# Patient Record
Sex: MALE | Race: WHITE
[De-identification: names, ages, dates, MRNs, and addresses within clinical notes are randomized per-mention and may not be internally consistent; named-entity substitution may affect disease eponyms.]

---

## 2018-03-26 ENCOUNTER — HOSPITAL ENCOUNTER (EMERGENCY)
Dept: HOSPITAL 25 - ED | Age: 23
LOS: 1 days | Discharge: HOME | End: 2018-03-27
Payer: COMMERCIAL

## 2018-03-26 DIAGNOSIS — R07.9: Primary | ICD-10-CM

## 2018-03-26 PROCEDURE — 71045 X-RAY EXAM CHEST 1 VIEW: CPT

## 2018-03-26 PROCEDURE — 99283 EMERGENCY DEPT VISIT LOW MDM: CPT

## 2018-03-26 PROCEDURE — 93005 ELECTROCARDIOGRAM TRACING: CPT

## 2018-03-27 VITALS — DIASTOLIC BLOOD PRESSURE: 65 MMHG | SYSTOLIC BLOOD PRESSURE: 107 MMHG

## 2018-03-27 NOTE — RAD
INDICATION: Chest pain     



COMPARISON: None

 

TECHNIQUE: An AP portable view obtained at 0312 hours is submitted.



FINDINGS: 



Bones/Soft Tissues: There are no acute bony findings.    



Cardiomediastinal: The cardiomediastinal silhouette is normal. 



Lungs: There are no infiltrates. There is no pneumothorax.



Pleura: There are no pleural effusions. 



Other: None



IMPRESSION:  NEGATIVE EXAMINATION.

## 2018-03-28 NOTE — ED
Rolando PALACIOS Angela, scribed for Sherif Ordonez MD on 03/27/18 at 0255 .





HPI Chest Pain





- HPI Summary


HPI Summary: 





This pt is a 23 y/[facility] sided chest pain for more 

than 24 hours. Pt reports that he has hx of GERD and this pain feels like GERD, 

but it doesn't typically last more than 24 hours. Pt describes chest pain as non

-radiating pressure on the top left of his chest. His GERD is usually 

alleviated with Tums. Pt took 3 Tums last night and advil this morning with no 

relief. Denie LE or UE weakness, numbness, tingling, back pain.


Pt had hard cider yesterday and spicy food (which is normal for him). 





- History of Current Complaint


Chief Complaint: EDAbdPain


Hx Obtained From: Patient


Onset/Duration: Started Hours Ago, Still Present


Timing: Constant, Lasting Hours


Current Severity: Mild


Pain Intensity: 3


Pain Scale Used: 0-10 Numeric


Chest Pain Location: Left Anterior


Chest Pain Radiates: No


Character: Pressure/Squeezing - Pressure


Aggravating Factor(s): Nothing


Alleviating Factor(s): Nothing


Associated Signs and Symptoms: Positive: Chest Pain.  Negative: Numbness, 

Tingling, Weakness, Fever, Back Pain





- Allergy/Home Medications


Allergies/Adverse Reactions: 


 Allergies











Allergy/AdvReac Type Severity Reaction Status Date / Time


 


No Known Allergies Allergy   Verified 03/26/18 23:37














PMH/Surg Hx/FS Hx/Imm Hx


Endocrine/Hematology History: 


   Denies: Hx Diabetes


Cardiovascular History: 


   Denies: Hx Hypertension





- Immunization History


Date of Influenza Vaccine: did not receive


Infectious Disease History: No


Infectious Disease History: 


   Denies: Traveled Outside the [place] in Last 30 Days





- Family History


Known Family History: 


   Negative: Hypertension, Diabetes





- Social History


Alcohol Use: Occasionally


Substance Use Type: Reports: None


Smoking Status (MU): Never Smoked Tobacco





Review of Systems


Negative: Fever


Positive: Chest Pain


Gastrointestinal: Negative


Genitourinary: Negative


Musculoskeletal: Negative


Negative: Weakness, Paresthesia, Numbness


All Other Systems Reviewed And Are Negative: Yes





Physical Exam





- Summary


Physical Exam Summary: 





General: No acute distress


Appearance: Well-appearing, Well-nourished


Skin: Warm


Eyes: Normal


ENT: Normal 


Neck: Supple, nontender


Respiratory: Clear to auscultation


Cardiovascular: Normal S1, S2. No murmurs. Normal distal pulses in tibial and 

radial bilaterally.


Abdomen: Soft, nontender


Musculoskeletal: Normal, Strength/ROM Intact


Neurological: Normal, A&Ox3


Psychiatric: Normal


Triage Information Reviewed: Yes


Vital Signs On Initial Exam: 


 Initial Vitals











Temp Pulse Resp BP Pulse Ox


 


 97.4 F   70   16   115/85   100 


 


 03/26/18 23:34  03/26/18 23:34  03/26/18 23:34  03/26/18 23:34  03/26/18 23:34











Vital Signs Reviewed: Yes





Diagnostics





- Vital Signs


 Vital Signs











  Temp Pulse Resp BP Pulse Ox


 


 03/27/18 00:30   63   102/63  100


 


 03/27/18 00:10   32    93


 


 03/27/18 00:09     111/66 


 


 03/26/18 23:34  97.4 F  70  16  115/85  100














- Laboratory


Lab Statement: Any lab studies that have been ordered have been reviewed, and 

results considered in the medical decision making process.





- Radiology


  ** Chest XR


Xray Interpretation: No Acute Changes - no acute intrathoracic disease.


Radiology Interpretation Completed By: ED Physician





- EKG


  ** 00:13


Cardiac Rate: NL


EKG Rhythm: Sinus Rhythm - at 66 bpm


EKG Interpretation: No acute ST changes. 





Chest Pain Course/Dx





- Course


Assessment/Plan: patient has no cardiac risk factors and no changes on EKG or 

chest XR. Symptoms improved with medications, I instructed the pt to return to 

the ED for any worsening or concerning symptoms. Agrees to and understands dc 

instructions.





- Diagnoses


Provider Diagnoses: 


 Chest pain








Discharge





- Sign-Out/Discharge


Documenting (check all that apply): Discharge - discharge to home





- Discharge Plan


Condition: Improved


Disposition: HOME


Patient Education Materials:  Chest Pain (ED)


Referrals: 


[facility] [Primary Care Provider] - 


Additional Instructions: 





PLEASE RETURN IMMEDIATELY TO THE ER IF YOU HAVE ANY WORSENING OR CONCERNING 

SYMPTOMS


PLEASE MAKE AN APPOINTMENT TO BE SEEN BY YOUR PRIMARY CARE DOCTOR WITHIN 1 WEEK








- Billing Disposition and Condition


Condition: IMPROVED


Disposition: HOME





The documentation as recorded by the Rolando menchaca Angela accurately reflects 

the service I personally performed and the decisions made by me, Sherif Ordonez MD.

## 2020-04-26 ENCOUNTER — MESSAGE (OUTPATIENT)
Age: 25
End: 2020-04-26

## 2020-05-02 LAB
SARS-COV-2 IGG SERPL IA-ACNC: <0.1 INDEX
SARS-COV-2 IGG SERPL QL IA: NEGATIVE

## 2020-05-05 ENCOUNTER — APPOINTMENT (OUTPATIENT)
Dept: DISASTER EMERGENCY | Facility: HOSPITAL | Age: 25
End: 2020-05-05